# Patient Record
Sex: FEMALE | Employment: OTHER
[De-identification: names, ages, dates, MRNs, and addresses within clinical notes are randomized per-mention and may not be internally consistent; named-entity substitution may affect disease eponyms.]

---

## 2020-06-10 ENCOUNTER — OFFICE VISIT (OUTPATIENT)
Age: 65
End: 2020-06-10

## 2020-06-15 ENCOUNTER — OFFICE VISIT (OUTPATIENT)
Dept: URBAN - METROPOLITAN AREA CLINIC 7 | Facility: CLINIC | Age: 65
End: 2020-06-15

## 2020-06-18 ENCOUNTER — TELEPHONE ENCOUNTER (OUTPATIENT)
Dept: URBAN - METROPOLITAN AREA CLINIC 9 | Facility: CLINIC | Age: 65
End: 2020-06-18

## 2020-06-18 ENCOUNTER — OFFICE VISIT (OUTPATIENT)
Age: 65
End: 2020-06-18

## 2020-07-07 ENCOUNTER — TELEPHONE ENCOUNTER (OUTPATIENT)
Dept: URBAN - METROPOLITAN AREA CLINIC 9 | Facility: CLINIC | Age: 65
End: 2020-07-07

## 2020-07-09 ENCOUNTER — OFFICE VISIT (OUTPATIENT)
Dept: URBAN - METROPOLITAN AREA CLINIC 7 | Facility: CLINIC | Age: 65
End: 2020-07-09

## 2020-07-16 ENCOUNTER — TELEPHONE ENCOUNTER (OUTPATIENT)
Dept: URBAN - METROPOLITAN AREA CLINIC 9 | Facility: CLINIC | Age: 65
End: 2020-07-16

## 2020-07-27 ENCOUNTER — OFFICE VISIT (OUTPATIENT)
Dept: URBAN - METROPOLITAN AREA SURGERY CENTER 5 | Facility: SURGERY CENTER | Age: 65
End: 2020-07-27

## 2020-08-06 ENCOUNTER — OFFICE VISIT (OUTPATIENT)
Dept: URBAN - METROPOLITAN AREA CLINIC 7 | Facility: CLINIC | Age: 65
End: 2020-08-06

## 2020-10-01 ENCOUNTER — OFFICE VISIT (OUTPATIENT)
Age: 65
End: 2020-10-01

## 2021-05-15 ENCOUNTER — OFFICE VISIT (OUTPATIENT)
Age: 66
End: 2021-05-15

## 2021-07-01 ENCOUNTER — OFFICE VISIT (OUTPATIENT)
Age: 66
End: 2021-07-01

## 2021-09-23 ENCOUNTER — OFFICE VISIT (OUTPATIENT)
Dept: URBAN - METROPOLITAN AREA CLINIC 7 | Facility: CLINIC | Age: 66
End: 2021-09-23

## 2021-10-06 ENCOUNTER — LAB OUTSIDE AN ENCOUNTER (OUTPATIENT)
Age: 66
End: 2021-10-06

## 2021-10-06 LAB — GIARDIA AG, EIA, STOOL: (no result)

## 2021-10-11 LAB — CONCENTRATE (1): (no result)

## 2021-10-13 LAB
CALPROTECTIN, STOOL: (no result)
CAMPYLOBACTER, CULTURE: (no result)
CLOSTRIDIUM DIFFICILE TOXINB,QL REAL TIME PCR: NOT DETECTED
CRYPTOSPORIDIUM ANTIGEN, EIA: (no result)
GIARDIA AG, EIA, STOOL: (no result)
OVA AND PARASITES, CONC AND PERM SMEAR: (no result)
PANCREATIC ELASTASE-1: (no result)

## 2021-10-21 ENCOUNTER — TELEPHONE ENCOUNTER (OUTPATIENT)
Dept: URBAN - METROPOLITAN AREA CLINIC 9 | Facility: CLINIC | Age: 66
End: 2021-10-21

## 2021-10-22 ENCOUNTER — TELEPHONE ENCOUNTER (OUTPATIENT)
Dept: URBAN - METROPOLITAN AREA CLINIC 9 | Facility: CLINIC | Age: 66
End: 2021-10-22

## 2021-10-25 ENCOUNTER — OFFICE VISIT (OUTPATIENT)
Dept: URBAN - METROPOLITAN AREA SURGERY CENTER 5 | Facility: SURGERY CENTER | Age: 66
End: 2021-10-25

## 2021-10-26 ENCOUNTER — TELEPHONE ENCOUNTER (OUTPATIENT)
Dept: URBAN - METROPOLITAN AREA CLINIC 9 | Facility: CLINIC | Age: 66
End: 2021-10-26

## 2021-11-02 ENCOUNTER — TELEPHONE ENCOUNTER (OUTPATIENT)
Dept: URBAN - METROPOLITAN AREA CLINIC 9 | Facility: CLINIC | Age: 66
End: 2021-11-02

## 2021-11-16 ENCOUNTER — OFFICE VISIT (OUTPATIENT)
Dept: URBAN - METROPOLITAN AREA CLINIC 7 | Facility: CLINIC | Age: 66
End: 2021-11-16

## 2022-01-07 ENCOUNTER — OFFICE VISIT (OUTPATIENT)
Dept: URBAN - METROPOLITAN AREA CLINIC 7 | Facility: CLINIC | Age: 67
End: 2022-01-07

## 2022-01-10 ENCOUNTER — OFFICE VISIT (OUTPATIENT)
Dept: URBAN - METROPOLITAN AREA CLINIC 7 | Facility: CLINIC | Age: 67
End: 2022-01-10

## 2022-01-20 ENCOUNTER — TELEPHONE ENCOUNTER (OUTPATIENT)
Dept: URBAN - METROPOLITAN AREA CLINIC 9 | Facility: CLINIC | Age: 67
End: 2022-01-20

## 2022-01-26 ENCOUNTER — TELEPHONE ENCOUNTER (OUTPATIENT)
Dept: URBAN - METROPOLITAN AREA CLINIC 9 | Facility: CLINIC | Age: 67
End: 2022-01-26

## 2022-01-29 ENCOUNTER — TELEPHONE ENCOUNTER (OUTPATIENT)
Dept: URBAN - METROPOLITAN AREA CLINIC 9 | Facility: CLINIC | Age: 67
End: 2022-01-29

## 2022-02-02 ENCOUNTER — LAB OUTSIDE AN ENCOUNTER (OUTPATIENT)
Age: 67
End: 2022-02-02

## 2022-02-03 LAB
ABSOLUTE BASOPHILS: (no result)
ABSOLUTE EOSINOPHILS: (no result)
ABSOLUTE LYMPHOCYTES: (no result)
ABSOLUTE MONOCYTES: (no result)
ABSOLUTE NEUTROPHILS: (no result)
ALBUMIN/GLOBULIN RATIO: (no result)
ALBUMIN: (no result)
ALKALINE PHOSPHATASE: (no result)
ALT: (no result)
AMYLASE: (no result)
AST: (no result)
BASOPHILS: (no result)
BILIRUBIN, TOTAL: (no result)
BUN/CREATININE RATIO: (no result)
CA 19-9: (no result)
CALCIUM: (no result)
CARBON DIOXIDE: (no result)
CHLORIDE: (no result)
CREATININE: (no result)
EGFR AFRICAN AMERICAN: 105
EGFR NON-AFR. AMERICAN: 91
EOSINOPHILS: (no result)
GLOBULIN: 3
GLUCOSE: (no result)
HEMATOCRIT: (no result)
HEMOGLOBIN: (no result)
LIPASE: (no result)
LYMPHOCYTES: (no result)
MCH: (no result)
MCHC: (no result)
MCV: (no result)
MONOCYTES: (no result)
MPV: (no result)
NEUTROPHILS: (no result)
PLATELET COUNT: 356
POTASSIUM: (no result)
PROTEIN, TOTAL: (no result)
RDW: (no result)
RED BLOOD CELL COUNT: (no result)
SODIUM: (no result)
UREA NITROGEN (BUN): (no result)
VITAMIN D,25-OH,TOTAL,IA: (no result)
WHITE BLOOD CELL COUNT: 6.6

## 2022-02-04 ENCOUNTER — TELEPHONE ENCOUNTER (OUTPATIENT)
Dept: URBAN - METROPOLITAN AREA CLINIC 9 | Facility: CLINIC | Age: 67
End: 2022-02-04

## 2022-05-20 ENCOUNTER — TELEPHONE ENCOUNTER (OUTPATIENT)
Dept: URBAN - METROPOLITAN AREA CLINIC 9 | Facility: CLINIC | Age: 67
End: 2022-05-20

## 2022-05-23 ENCOUNTER — OFFICE VISIT (OUTPATIENT)
Dept: URBAN - METROPOLITAN AREA CLINIC 7 | Facility: CLINIC | Age: 67
End: 2022-05-23

## 2022-07-30 ENCOUNTER — TELEPHONE ENCOUNTER (OUTPATIENT)
Age: 67
End: 2022-07-30

## 2022-07-30 RX ORDER — PANCRELIPASE 24000; 76000; 120000 [USP'U]/1; [USP'U]/1; [USP'U]/1
2 (TWO) CAPSULE, DELAYED RELEASE PELLETS ORAL
Qty: 0 | Refills: 16 | OUTPATIENT
Start: 2017-05-01 | End: 2020-06-10

## 2022-07-30 RX ORDER — GLYCERIN, PETROLATUM, PHENYLEPHRINE HCL, PRAMOXINE HCL 144; 2.5; 10; 15 MG/G; MG/G; MG/G; MG/G
1 (ONE) CREAM TOPICAL TWICE A DAY
Qty: 0 | Refills: 2 | OUTPATIENT
Start: 2016-12-01 | End: 2016-12-08

## 2022-07-30 RX ORDER — PSYLLIUM SEED
1 TABLESPOON PACKET (EA) ORAL
Qty: 0 | Refills: 16 | OUTPATIENT
Start: 2016-12-01 | End: 2017-04-03

## 2022-07-30 RX ORDER — MULTIVIT-MIN/IRON/FOLIC ACID/K 18-600-40
1 (ONE) CAPSULE ORAL
Qty: 0 | Refills: 16 | OUTPATIENT
Start: 2017-04-03 | End: 2022-01-07

## 2022-07-31 ENCOUNTER — TELEPHONE ENCOUNTER (OUTPATIENT)
Age: 67
End: 2022-07-31

## 2022-07-31 RX ORDER — PANCRELIPASE 24000; 76000; 120000 [USP'U]/1; [USP'U]/1; [USP'U]/1
2 (TWO) CAPSULE, DELAYED RELEASE PELLETS ORAL
Qty: 0 | Refills: 16 | Status: ACTIVE | COMMUNITY
Start: 2017-05-01

## 2022-07-31 RX ORDER — GLYCERIN, PETROLATUM, PHENYLEPHRINE HCL, PRAMOXINE HCL 144; 2.5; 10; 15 MG/G; MG/G; MG/G; MG/G
1 (ONE) CREAM TOPICAL TWICE A DAY
Qty: 0 | Refills: 2 | Status: ACTIVE | COMMUNITY
Start: 2016-12-01

## 2022-07-31 RX ORDER — PSYLLIUM SEED
1 TABLESPOON PACKET (EA) ORAL
Qty: 0 | Refills: 16 | Status: ACTIVE | COMMUNITY
Start: 2016-12-01

## 2022-07-31 RX ORDER — MULTIVIT-MIN/IRON/FOLIC ACID/K 18-600-40
1 (ONE) CAPSULE ORAL
Qty: 0 | Refills: 16 | Status: ACTIVE | COMMUNITY
Start: 2017-04-03

## 2022-08-01 ENCOUNTER — LAB OUTSIDE AN ENCOUNTER (OUTPATIENT)
Dept: URBAN - METROPOLITAN AREA CLINIC 7 | Facility: CLINIC | Age: 67
End: 2022-08-01

## 2022-08-01 ENCOUNTER — TELEPHONE ENCOUNTER (OUTPATIENT)
Dept: URBAN - METROPOLITAN AREA CLINIC 7 | Facility: CLINIC | Age: 67
End: 2022-08-01

## 2022-08-01 ENCOUNTER — WEB ENCOUNTER (OUTPATIENT)
Dept: URBAN - METROPOLITAN AREA CLINIC 7 | Facility: CLINIC | Age: 67
End: 2022-08-01

## 2022-08-01 ENCOUNTER — OFFICE VISIT (OUTPATIENT)
Dept: URBAN - METROPOLITAN AREA CLINIC 7 | Facility: CLINIC | Age: 67
End: 2022-08-01
Payer: MEDICARE

## 2022-08-01 VITALS
SYSTOLIC BLOOD PRESSURE: 132 MMHG | BODY MASS INDEX: 22.16 KG/M2 | WEIGHT: 133 LBS | TEMPERATURE: 97.8 F | DIASTOLIC BLOOD PRESSURE: 82 MMHG | HEIGHT: 65 IN

## 2022-08-01 DIAGNOSIS — Z98.890 HISTORY OF PANCREATIC SURGERY: ICD-10-CM

## 2022-08-01 DIAGNOSIS — K86.1 CHRONIC PANCREATITIS, UNSPECIFIED PANCREATITIS TYPE: ICD-10-CM

## 2022-08-01 DIAGNOSIS — K86.89 PANCREATIC INSUFFICIENCY: ICD-10-CM

## 2022-08-01 DIAGNOSIS — R63.4 WEIGHT LOSS: ICD-10-CM

## 2022-08-01 PROCEDURE — 99204 OFFICE O/P NEW MOD 45 MIN: CPT | Performed by: INTERNAL MEDICINE

## 2022-08-01 RX ORDER — PSYLLIUM SEED
1 TABLESPOON PACKET (EA) ORAL
Qty: 0 | Refills: 16 | Status: DISCONTINUED | COMMUNITY
Start: 2016-12-01

## 2022-08-01 RX ORDER — COLESTIPOL HYDROCHLORIDE 1 G/1
2 TABLETS TABLET, FILM COATED ORAL TWICE A DAY
Qty: 120 TABLET | Refills: 3
Start: 2022-08-01

## 2022-08-01 RX ORDER — QUETIAPINE 100 MG/1
1 TABLET AT BEDTIME TABLET, FILM COATED ORAL ONCE A DAY
Status: ACTIVE | COMMUNITY

## 2022-08-01 RX ORDER — GLYCERIN, PETROLATUM, PHENYLEPHRINE HCL, PRAMOXINE HCL 144; 2.5; 10; 15 MG/G; MG/G; MG/G; MG/G
1 (ONE) CREAM TOPICAL TWICE A DAY
Qty: 0 | Refills: 2 | Status: DISCONTINUED | COMMUNITY
Start: 2016-12-01

## 2022-08-01 RX ORDER — COLESTIPOL HYDROCHLORIDE 1 G/1
2 TABLETS TABLET, FILM COATED ORAL TWICE A DAY
Qty: 120 TABLET | Refills: 3 | OUTPATIENT
Start: 2022-08-01

## 2022-08-01 RX ORDER — MULTIVIT-MIN/IRON/FOLIC ACID/K 18-600-40
1 (ONE) CAPSULE ORAL
Qty: 0 | Refills: 16 | Status: ACTIVE | COMMUNITY
Start: 2017-04-03

## 2022-08-01 RX ORDER — PANCRELIPASE 24000; 76000; 120000 [USP'U]/1; [USP'U]/1; [USP'U]/1
2 (TWO) CAPSULE, DELAYED RELEASE PELLETS ORAL
Qty: 0 | Refills: 16 | Status: ACTIVE | COMMUNITY
Start: 2017-05-01

## 2022-08-01 NOTE — HPI-TODAY'S VISIT:
LV 1/2022. Eval was initially for perianal soreness, soiling, and prolapse with internal hemorrhoids. Hx of chronic calcific pancreatitis from likely EtOH and tobacco use, s/p Eddy procedure in 2017 with Dr. Mcdonald. Normal fecal elastase then and EUS at that time with no discrete pancreatic mass. Hx of polyps (colon in 2016 with 2 TA). Colon done 7/2020 with 4 adenomas removed, and 1 large 3-cm SSA that could not be removed endoscopically with right hemicolectomy in 8/2020 with Dr. Tracy, hernia repair, and lysis of adhesions. She underwent hemorrhoidal banding after her surgery, and then had recurrence of ventral hernia (repaired by Dr. Broderick). Issues with diarrhea post-operatively. Calprotectin 23, elastase 73, cdiff negative, culture/giardia negative. Colon 10/2021 with multiple small HP and TA polyps, bx negative for inflammation, and small int hems. MRI pancreas ordered not done. Treated with Creon. Recent lab testing done December 20, 2021 demonstrated a hemoglobin of 13.3 and a normal INR and normal creatinine and liver function tests.  She had a recent robotic-assisted laparoscopic repair of a recurrent incisional hernia with mesh in December 2021. CT pancreas 1/2022 with chronic calcific pancreatitis changes, no mass, mild CBD dilation to 12 mm. CBC, CMP, amylase, lipase, CA 19-9 all normal. Treated with Creon, low fat diet. 137 lbs last visit. FU now for weight loss. She tells me she has lost a little weight, about low 130s. Using Creon 2 tabs with meals, 1 with snack. Appetite is good, drive to eat is strong, eating well, no nausea, no vomiting, no bleeding. She is having some night sweats as well. Did have some night sweats, no fevers. Her lack of weight gain is her main concern.

## 2022-08-09 ENCOUNTER — LAB OUTSIDE AN ENCOUNTER (OUTPATIENT)
Dept: URBAN - METROPOLITAN AREA CLINIC 7 | Facility: CLINIC | Age: 67
End: 2022-08-09

## 2022-08-10 ENCOUNTER — TELEPHONE ENCOUNTER (OUTPATIENT)
Dept: URBAN - METROPOLITAN AREA CLINIC 7 | Facility: CLINIC | Age: 67
End: 2022-08-10

## 2022-08-10 LAB
A/G RATIO: 1.5
ABSOLUTE BASOPHILS: 111
ABSOLUTE EOSINOPHILS: 118
ABSOLUTE LYMPHOCYTES: 1769
ABSOLUTE MONOCYTES: 481
ABSOLUTE NEUTROPHILS: 4921
ALBUMIN: 4.3
ALKALINE PHOSPHATASE: 56
ALT (SGPT): 16
AMYLASE: 48
AST (SGOT): 14
BASOPHILS: 1.5
BILIRUBIN, TOTAL: 0.5
BUN/CREATININE RATIO: (no result)
BUN: 16
CA 19-9: 18
CALCIUM: 10
CARBON DIOXIDE, TOTAL: 28
CEA: 1.2
CHLORIDE: 105
CREATININE: 0.74
EGFR: 89
EOSINOPHILS: 1.6
GLOBULIN, TOTAL: 2.9
GLUCOSE: 105
HEMATOCRIT: 41.4
HEMOGLOBIN: 14
LIPASE: 31
LYMPHOCYTES: 23.9
MCH: 30
MCHC: 33.8
MCV: 88.8
MONOCYTES: 6.5
MPV: 8.6
NEUTROPHILS: 66.5
PLATELET COUNT: 405
POTASSIUM: 4.1
PROTEIN, TOTAL: 7.2
RDW: 12.7
RED BLOOD CELL COUNT: 4.66
SODIUM: 140
WHITE BLOOD CELL COUNT: 7.4

## 2022-08-19 ENCOUNTER — OFFICE VISIT (OUTPATIENT)
Dept: URBAN - METROPOLITAN AREA CLINIC 7 | Facility: CLINIC | Age: 67
End: 2022-08-19

## 2022-10-12 ENCOUNTER — TELEPHONE ENCOUNTER (OUTPATIENT)
Dept: URBAN - METROPOLITAN AREA CLINIC 7 | Facility: CLINIC | Age: 67
End: 2022-10-12

## 2022-10-12 RX ORDER — MULTIVIT-MIN/IRON/FOLIC ACID/K 18-600-40
1 (ONE) CAPSULE ORAL
Qty: 0 | Refills: 16 | Status: ACTIVE | COMMUNITY
Start: 2017-04-03

## 2022-10-12 RX ORDER — PANCRELIPASE 36000; 180000; 114000 [USP'U]/1; [USP'U]/1; [USP'U]/1
AS DIRECTED CAPSULE, DELAYED RELEASE PELLETS ORAL
Qty: 250 CAPSULE | Refills: 3 | OUTPATIENT

## 2022-10-12 RX ORDER — QUETIAPINE 100 MG/1
1 TABLET AT BEDTIME TABLET, FILM COATED ORAL ONCE A DAY
Status: ACTIVE | COMMUNITY

## 2022-10-12 RX ORDER — PANCRELIPASE 24000; 76000; 120000 [USP'U]/1; [USP'U]/1; [USP'U]/1
2 (TWO) CAPSULE, DELAYED RELEASE PELLETS ORAL
Qty: 0 | Refills: 16 | Status: ACTIVE | COMMUNITY
Start: 2017-05-01

## 2022-10-12 RX ORDER — COLESTIPOL HYDROCHLORIDE 1 G/1
2 TABLETS TABLET, FILM COATED ORAL TWICE A DAY
Qty: 120 TABLET | Refills: 3 | Status: ACTIVE | COMMUNITY
Start: 2022-08-01

## 2022-10-26 ENCOUNTER — TELEPHONE ENCOUNTER (OUTPATIENT)
Dept: URBAN - METROPOLITAN AREA CLINIC 9 | Facility: CLINIC | Age: 67
End: 2022-10-26

## 2022-10-26 RX ORDER — PANCRELIPASE 36000; 180000; 114000 [USP'U]/1; [USP'U]/1; [USP'U]/1
AS DIRECTED CAPSULE, DELAYED RELEASE PELLETS ORAL
Qty: 900 | Refills: 3

## 2022-11-28 ENCOUNTER — OFFICE VISIT (OUTPATIENT)
Dept: URBAN - METROPOLITAN AREA CLINIC 7 | Facility: CLINIC | Age: 67
End: 2022-11-28

## 2022-12-01 ENCOUNTER — TELEPHONE ENCOUNTER (OUTPATIENT)
Dept: URBAN - METROPOLITAN AREA CLINIC 7 | Facility: CLINIC | Age: 67
End: 2022-12-01

## 2022-12-07 ENCOUNTER — ERX REFILL RESPONSE (OUTPATIENT)
Dept: URBAN - METROPOLITAN AREA CLINIC 7 | Facility: CLINIC | Age: 67
End: 2022-12-07

## 2022-12-07 RX ORDER — COLESTIPOL HYDROCHLORIDE 1 G/1
2 TABLETS TABLET, FILM COATED ORAL TWICE A DAY
Qty: 120 TABLET | Refills: 3 | OUTPATIENT

## 2022-12-07 RX ORDER — COLESTIPOL HYDROCHLORIDE 1 G/1
TAKE TWO TABLETS BY MOUTH TWICE A DAY FOR 30 DAYS TABLET ORAL
Qty: 120 TABLET | Refills: 3 | OUTPATIENT

## 2022-12-09 ENCOUNTER — ERX REFILL RESPONSE (OUTPATIENT)
Dept: URBAN - METROPOLITAN AREA CLINIC 7 | Facility: CLINIC | Age: 67
End: 2022-12-09

## 2022-12-09 RX ORDER — COLESTIPOL HYDROCHLORIDE 1 G/1
TAKE TWO TABLETS BY MOUTH TWICE A DAY FOR 30 DAYS TABLET ORAL
Qty: 120 TABLET | Refills: 3 | OUTPATIENT

## 2022-12-13 ENCOUNTER — TELEPHONE ENCOUNTER (OUTPATIENT)
Dept: URBAN - METROPOLITAN AREA CLINIC 7 | Facility: CLINIC | Age: 67
End: 2022-12-13

## 2023-01-31 ENCOUNTER — TELEPHONE ENCOUNTER (OUTPATIENT)
Dept: URBAN - METROPOLITAN AREA CLINIC 7 | Facility: CLINIC | Age: 68
End: 2023-01-31

## 2023-01-31 RX ORDER — COLESTIPOL HYDROCHLORIDE 1 G/1
2 TABLETS TABLET ORAL TWICE DAILY
Qty: 360 TABLET | Refills: 3

## 2023-02-27 ENCOUNTER — OFFICE VISIT (OUTPATIENT)
Dept: URBAN - METROPOLITAN AREA CLINIC 7 | Facility: CLINIC | Age: 68
End: 2023-02-27
Payer: MEDICARE

## 2023-02-27 ENCOUNTER — WEB ENCOUNTER (OUTPATIENT)
Dept: URBAN - METROPOLITAN AREA CLINIC 7 | Facility: CLINIC | Age: 68
End: 2023-02-27

## 2023-02-27 VITALS
DIASTOLIC BLOOD PRESSURE: 70 MMHG | SYSTOLIC BLOOD PRESSURE: 114 MMHG | BODY MASS INDEX: 22.82 KG/M2 | TEMPERATURE: 97.2 F | HEIGHT: 65 IN | HEART RATE: 68 BPM | WEIGHT: 137 LBS

## 2023-02-27 DIAGNOSIS — K86.89 PANCREATIC INSUFFICIENCY: ICD-10-CM

## 2023-02-27 DIAGNOSIS — Z98.890 HISTORY OF PANCREATIC SURGERY: ICD-10-CM

## 2023-02-27 DIAGNOSIS — R63.4 WEIGHT LOSS: ICD-10-CM

## 2023-02-27 DIAGNOSIS — K86.1 CHRONIC PANCREATITIS, UNSPECIFIED PANCREATITIS TYPE: ICD-10-CM

## 2023-02-27 PROBLEM — 89362005: Status: ACTIVE | Noted: 2022-08-01

## 2023-02-27 PROBLEM — 235494005: Status: ACTIVE | Noted: 2022-08-01

## 2023-02-27 PROCEDURE — 99214 OFFICE O/P EST MOD 30 MIN: CPT | Performed by: INTERNAL MEDICINE

## 2023-02-27 RX ORDER — QUETIAPINE 100 MG/1
1 TABLET AT BEDTIME TABLET, FILM COATED ORAL ONCE A DAY
Status: ACTIVE | COMMUNITY

## 2023-02-27 RX ORDER — PANCRELIPASE 36000; 180000; 114000 [USP'U]/1; [USP'U]/1; [USP'U]/1
AS DIRECTED CAPSULE, DELAYED RELEASE PELLETS ORAL
Qty: 900 | Refills: 3 | Status: ACTIVE | COMMUNITY

## 2023-02-27 RX ORDER — PANCRELIPASE 24000; 76000; 120000 [USP'U]/1; [USP'U]/1; [USP'U]/1
2 (TWO) CAPSULE, DELAYED RELEASE PELLETS ORAL
Qty: 0 | Refills: 16 | Status: DISCONTINUED | COMMUNITY
Start: 2017-05-01

## 2023-02-27 RX ORDER — COLESTIPOL HYDROCHLORIDE 1 G/1
2 TABLETS TABLET ORAL TWICE DAILY
Qty: 360 TABLET | Refills: 3 | Status: ACTIVE | COMMUNITY

## 2023-02-27 RX ORDER — PANCRELIPASE 36000; 180000; 114000 [USP'U]/1; [USP'U]/1; [USP'U]/1
AS DIRECTED CAPSULE, DELAYED RELEASE PELLETS ORAL
Qty: 480 CAPSULE | Refills: 8

## 2023-02-27 RX ORDER — MULTIVIT-MIN/IRON/FOLIC ACID/K 18-600-40
1 (ONE) CAPSULE ORAL
Qty: 0 | Refills: 16 | Status: ACTIVE | COMMUNITY
Start: 2017-04-03

## 2023-02-27 NOTE — HPI-TODAY'S VISIT:
LV 8/2022. Eval was initially for perianal soreness, soiling, and prolapse with internal hemorrhoids. Also, hx of chronic calcific pancreatitis from likely EtOH and tobacco use, s/p Eddy procedure in 2017 with Dr. Mcdonald. Normal fecal elastase then and EUS at that time with no discrete pancreatic mass. Hx of polyps (colon in 2016 with 2 TA). Colon done 7/2020 with 4 adenomas removed, and 1 large 3-cm SSA that could not be removed endoscopically with right hemicolectomy in 8/2020 with Dr. Tracy, hernia repair, and lysis of adhesions. She underwent hemorrhoidal banding after her surgery, and then had recurrence of ventral hernia (repaired by Dr. Broderick). Continued with issues with diarrhea post-operatively. Calprotectin 23, elastase 73, cdiff negative, culture/giardia negative. Colon 10/2021 with multiple small HP and TA polyps, bx negative for inflammation, and small int hems. Treated with Creon. Lab testing done December 20, 2021 demonstrated a hemoglobin of 13.3 and a normal INR and normal creatinine and liver function tests. Had a robotic-assisted laparoscopic repair of a recurrent incisional hernia with mesh in December 2021. CT pancreas 1/2022 with chronic calcific pancreatitis changes, no mass, mild CBD dilation to 12 mm. CBC, CMP, amylase, lipase, CA 19-9 all normal. Treated with Creon, low fat diet. 137 lbs last visit. LV, eval for weight loss. She tells me she has lost a little weight, about low 130s. Using Creon 2 tabs with meals, 1 with snack. Appetite is good, drive to eat is strong, eating well, no nausea, no vomiting, no bleeding. Was having some night sweats as well. Did have some night sweats, no fevers. Her lack of weight gain is her main concern. Plan was for CT pancreas to re-evaluate pancreas given her weight loss, but do feel that her pancreatic insufficiency is leading to some of her weight issues and malabsorption. Bile salt diarrhea is also possible and so started colestipol. Wanted CBC, CMP, amylase, lipase, CA 19-9, CEA as well. Advised to speak with gyne about night sweats as well in case of a hormonal cause.  CT pancreas August 2022 demonstrated a small benign liver cyst, absent gallbladder, compensatory dilation of the CBD measuring 1.2 cm, no masses or stones, diffuse atrophic changes the pancreatic body and tail with coarse dystrophic calcifications seen throughout the pancreatic body and tail and mild dilation of the mid and distal pancreatic duct with no evidence of associated masses, no lymphadenopathy, no bowel inflammation.  Labs in August 2022 demonstrated normal CEA and CA 19-9 as well as a normal comprehensive panel normal pancreatic enzymes and hemoglobin of 14.  Tells me she had some "pancreas" pain a few weeks ago, but generally doing okay. Weight is up 4 lbs since last visit. She has started some bioidentical hormones for low estrogen and low testosterone but then had abd pain and stopped and felt better. Uses estrogel on her arms. Uses Creon still, 2-3 with each meals. She is eating pretty well.

## 2023-09-20 ENCOUNTER — TELEPHONE ENCOUNTER (OUTPATIENT)
Dept: URBAN - METROPOLITAN AREA CLINIC 7 | Facility: CLINIC | Age: 68
End: 2023-09-20

## 2023-12-01 ENCOUNTER — LAB OUTSIDE AN ENCOUNTER (OUTPATIENT)
Dept: URBAN - METROPOLITAN AREA CLINIC 7 | Facility: CLINIC | Age: 68
End: 2023-12-01

## 2023-12-01 ENCOUNTER — DASHBOARD ENCOUNTERS (OUTPATIENT)
Age: 68
End: 2023-12-01

## 2023-12-01 ENCOUNTER — OFFICE VISIT (OUTPATIENT)
Dept: URBAN - METROPOLITAN AREA CLINIC 7 | Facility: CLINIC | Age: 68
End: 2023-12-01
Payer: MEDICARE

## 2023-12-01 VITALS
SYSTOLIC BLOOD PRESSURE: 110 MMHG | TEMPERATURE: 97.8 F | DIASTOLIC BLOOD PRESSURE: 70 MMHG | BODY MASS INDEX: 22.99 KG/M2 | RESPIRATION RATE: 16 BRPM | HEIGHT: 65 IN | WEIGHT: 138 LBS

## 2023-12-01 DIAGNOSIS — K86.89 PANCREATIC INSUFFICIENCY: ICD-10-CM

## 2023-12-01 DIAGNOSIS — R63.4 WEIGHT LOSS: ICD-10-CM

## 2023-12-01 DIAGNOSIS — Z98.890 HISTORY OF PANCREATIC SURGERY: ICD-10-CM

## 2023-12-01 DIAGNOSIS — K86.1 CHRONIC PANCREATITIS, UNSPECIFIED PANCREATITIS TYPE: ICD-10-CM

## 2023-12-01 PROCEDURE — 99214 OFFICE O/P EST MOD 30 MIN: CPT | Performed by: INTERNAL MEDICINE

## 2023-12-01 RX ORDER — COLESTIPOL HYDROCHLORIDE 1 G/1
2 TABLETS TABLET ORAL TWICE DAILY
Qty: 360 TABLET | Refills: 3 | Status: ACTIVE | COMMUNITY

## 2023-12-01 RX ORDER — PANCRELIPASE 36000; 180000; 114000 [USP'U]/1; [USP'U]/1; [USP'U]/1
AS DIRECTED CAPSULE, DELAYED RELEASE PELLETS ORAL
Qty: 480 CAPSULE | Refills: 8
End: 2024-08-27

## 2023-12-01 RX ORDER — VALACYCLOVIR 500 MG/1
TAKE ONE TABLET BY MOUTH ONE TIME DAILY TABLET ORAL
Qty: 90 UNSPECIFIED | Refills: 0 | Status: ACTIVE | COMMUNITY

## 2023-12-01 RX ORDER — MULTIVIT-MIN/IRON/FOLIC ACID/K 18-600-40
1 (ONE) CAPSULE ORAL
Qty: 0 | Refills: 16 | Status: ACTIVE | COMMUNITY
Start: 2017-04-03

## 2023-12-01 RX ORDER — QUETIAPINE 100 MG/1
1 TABLET AT BEDTIME TABLET, FILM COATED ORAL ONCE A DAY
Status: ACTIVE | COMMUNITY

## 2023-12-01 RX ORDER — PANCRELIPASE 36000; 180000; 114000 [USP'U]/1; [USP'U]/1; [USP'U]/1
AS DIRECTED CAPSULE, DELAYED RELEASE PELLETS ORAL
Qty: 480 CAPSULE | Refills: 8 | Status: ACTIVE | COMMUNITY

## 2023-12-01 NOTE — HPI-TODAY'S VISIT:
LV 2/2023. Eval was initially for perianal soreness, soiling, and prolapse with internal hemorrhoids. Also, hx of chronic calcific pancreatitis from likely EtOH and tobacco use, s/p Eddy procedure in 2017 with Dr. Mcdonald. Normal fecal elastase then and EUS at that time with no discrete pancreatic mass. Hx of polyps (colon in 2016 with 2 TA). Colon done 7/2020 with 4 adenomas removed, and 1 large 3-cm SSA that could not be removed endoscopically with right hemicolectomy in 8/2020 with Dr. Tracy, hernia repair, and lysis of adhesions. She underwent hemorrhoidal banding after her surgery, and then had recurrence of ventral hernia (repaired by Dr. Broderick). Continued with issues with diarrhea post-operatively. Calprotectin 23, elastase 73, cdiff negative, culture/giardia negative. Colon 10/2021 with multiple small HP and TA polyps, bx negative for inflammation, and small int hems. Treated with Creon. Lab testing done December 20, 2021 demonstrated a hemoglobin of 13.3 and a normal INR and normal creatinine and liver function tests. Had a robotic-assisted laparoscopic repair of a recurrent incisional hernia with mesh in December 2021. CT pancreas 1/2022 with chronic calcific pancreatitis changes, no mass, mild CBD dilation to 12 mm. CBC, CMP, amylase, lipase, CA 19-9 all normal. Treated with Creon, low fat diet. 137 lbs last visit. LV, eval for weight loss. She tells me she has lost a little weight, about low 130s. Using Creon 2 tabs with meals, 1 with snack. Appetite is good, drive to eat is strong, eating well, no nausea, no vomiting, no bleeding. Was having some night sweats as well. Did have some night sweats, no fevers. Her lack of weight gain was her main concern. Plan was for CT pancreas to re-evaluate pancreas given her weight loss, but did feel that her pancreatic insufficiency was leading to some of her weight issues and malabsorption. Bile salt diarrhea is also possible and so started colestipol. Wanted CBC, CMP, amylase, lipase, CA 19-9, CEA as well. Advised to speak with gyne about night sweats as well in case of a hormonal cause. CT pancreas August 2022 demonstrated a small benign liver cyst, absent gallbladder, compensatory dilation of the CBD measuring 1.2 cm, no masses or stones, diffuse atrophic changes the pancreatic body and tail with coarse dystrophic calcifications seen throughout the pancreatic body and tail and mild dilation of the mid and distal pancreatic duct with no evidence of associated masses, no lymphadenopathy, no bowel inflammation.  Labs in August 2022 demonstrated normal CEA and CA 19-9 as well as a normal comprehensive panel normal pancreatic enzymes and hemoglobin of 14. Told me she had some "pancreas" pain a few weeks ago, but generally doing okay. Weight was up 4 lbs since last visit. She has started some bioidentical hormones for low estrogen and low testosterone but then had abd pain and stopped and felt better. Uses estrogel on her arms. Uses Creon still, 2-3 with each meals. She is eating pretty well. At last visit, it was unclear whether or not her hormonal supplements had led to more pain recently, although at the time of her visit she was better.  She continue with pancreatic enzymes and colestipol she has diarrhea without those agents.  I did expect her to have some pain symptoms from time to time depending on her diet but did not think she needed imaging again.  Plan was to get a CT pancreas in August 2023.  Her weight is stable, 138, was 137 last time. She has a little right flank discomfort, but eating well, enzymes are working well for her. Doing metamucil which has helped her stool consistency.

## 2023-12-15 ENCOUNTER — TELEPHONE ENCOUNTER (OUTPATIENT)
Dept: URBAN - METROPOLITAN AREA CLINIC 7 | Facility: CLINIC | Age: 68
End: 2023-12-15

## 2023-12-15 RX ORDER — COLESTIPOL HYDROCHLORIDE 1 G/1
2 TABLETS TABLET, FILM COATED ORAL TWICE DAILY
Qty: 120 | Refills: 3 | OUTPATIENT
Start: 2023-12-15

## 2024-01-04 ENCOUNTER — APPOINTMENT (RX ONLY)
Dept: URBAN - METROPOLITAN AREA CLINIC 333 | Facility: CLINIC | Age: 69
Setting detail: DERMATOLOGY
End: 2024-01-04

## 2024-01-04 ENCOUNTER — TELEPHONE ENCOUNTER (OUTPATIENT)
Dept: URBAN - METROPOLITAN AREA CLINIC 7 | Facility: CLINIC | Age: 69
End: 2024-01-04

## 2024-01-04 DIAGNOSIS — L98.8 OTHER SPECIFIED DISORDERS OF THE SKIN AND SUBCUTANEOUS TISSUE: ICD-10-CM

## 2024-01-04 DIAGNOSIS — L663 OTHER SPECIFIED DISEASES OF HAIR AND HAIR FOLLICLES: ICD-10-CM

## 2024-01-04 DIAGNOSIS — D22 MELANOCYTIC NEVI: ICD-10-CM

## 2024-01-04 DIAGNOSIS — L82.1 OTHER SEBORRHEIC KERATOSIS: ICD-10-CM

## 2024-01-04 DIAGNOSIS — L81.4 OTHER MELANIN HYPERPIGMENTATION: ICD-10-CM

## 2024-01-04 DIAGNOSIS — L57.8 OTHER SKIN CHANGES DUE TO CHRONIC EXPOSURE TO NONIONIZING RADIATION: ICD-10-CM

## 2024-01-04 DIAGNOSIS — L82.0 INFLAMED SEBORRHEIC KERATOSIS: ICD-10-CM

## 2024-01-04 DIAGNOSIS — L73.9 FOLLICULAR DISORDER, UNSPECIFIED: ICD-10-CM

## 2024-01-04 DIAGNOSIS — L738 OTHER SPECIFIED DISEASES OF HAIR AND HAIR FOLLICLES: ICD-10-CM

## 2024-01-04 PROBLEM — L02.821 FURUNCLE OF HEAD [ANY PART, EXCEPT FACE]: Status: ACTIVE | Noted: 2024-01-04

## 2024-01-04 PROBLEM — D22.5 MELANOCYTIC NEVI OF TRUNK: Status: ACTIVE | Noted: 2024-01-04

## 2024-01-04 PROCEDURE — 17110 DESTRUCTION B9 LES UP TO 14: CPT

## 2024-01-04 PROCEDURE — 99203 OFFICE O/P NEW LOW 30 MIN: CPT | Mod: 25

## 2024-01-04 PROCEDURE — ? FULL BODY SKIN EXAM

## 2024-01-04 PROCEDURE — ? TREATMENT REGIMEN

## 2024-01-04 PROCEDURE — ? COUNSELING

## 2024-01-04 PROCEDURE — ? PRESCRIPTION

## 2024-01-04 PROCEDURE — ? LIQUID NITROGEN

## 2024-01-04 RX ORDER — CLINDAMYCIN PHOSPHATE 10 MG/ML
SOLUTION TOPICAL QD
Qty: 60 | Refills: 4 | Status: ERX | COMMUNITY
Start: 2024-01-04

## 2024-01-04 RX ORDER — COLESTIPOL HYDROCHLORIDE 1 G/1
2 TABLETS TABLET ORAL TWICE DAILY
Qty: 360 TABLET | Refills: 3

## 2024-01-04 RX ORDER — TRETIONIN 0.5 MG/G
CREAM TOPICAL QOHS
Qty: 45 | Refills: 4 | Status: CANCELLED | COMMUNITY
Start: 2024-01-04

## 2024-01-04 RX ORDER — TRETIONIN 1 MG/G
CREAM TOPICAL QHS
Qty: 45 | Refills: 5 | Status: ERX | COMMUNITY
Start: 2024-01-04

## 2024-01-04 RX ADMIN — TRETIONIN: 1 CREAM TOPICAL at 00:00

## 2024-01-04 RX ADMIN — TRETIONIN: 0.5 CREAM TOPICAL at 00:00

## 2024-01-04 RX ADMIN — CLINDAMYCIN PHOSPHATE: 10 SOLUTION TOPICAL at 00:00

## 2024-01-04 ASSESSMENT — LOCATION SIMPLE DESCRIPTION DERM
LOCATION SIMPLE: LEFT BREAST
LOCATION SIMPLE: LEFT ANTERIOR NECK
LOCATION SIMPLE: RIGHT FOREHEAD
LOCATION SIMPLE: ANTERIOR SCALP
LOCATION SIMPLE: UPPER BACK
LOCATION SIMPLE: RIGHT UPPER BACK
LOCATION SIMPLE: RIGHT ANTERIOR NECK

## 2024-01-04 ASSESSMENT — LOCATION ZONE DERM
LOCATION ZONE: TRUNK
LOCATION ZONE: NECK
LOCATION ZONE: SCALP
LOCATION ZONE: FACE

## 2024-01-04 ASSESSMENT — LOCATION DETAILED DESCRIPTION DERM
LOCATION DETAILED: RIGHT INFERIOR MEDIAL FOREHEAD
LOCATION DETAILED: MID-FRONTAL SCALP
LOCATION DETAILED: LEFT INFERIOR LATERAL NECK
LOCATION DETAILED: LEFT LATERAL BREAST 1-2:00 REGION
LOCATION DETAILED: RIGHT SUPERIOR MEDIAL UPPER BACK
LOCATION DETAILED: RIGHT SUPERIOR LATERAL NECK
LOCATION DETAILED: INFERIOR THORACIC SPINE
LOCATION DETAILED: RIGHT INFERIOR LATERAL NECK

## 2024-01-04 NOTE — PROCEDURE: COUNSELING
Detail Level: Generalized
Detail Level: Zone
Patient Specific Counseling (Will Not Stick From Patient To Patient): Cosmetic cautery for $200 was discussed
Detail Level: Simple

## 2024-01-04 NOTE — PROCEDURE: LIQUID NITROGEN
Show Aperture Variable?: Yes
Render Note In Bullet Format When Appropriate: No
Detail Level: Detailed
Post-Care Instructions: Aftercare Cryosurgery\\nYou have just had cryotherapy for the treatment of benign (non-cancerous) skin lesions. You can expect the pain to rapidly decrease over the next 45 minutes. The area treated will initially turn red and over the next 72 hours turn brown to black. A scab will form that will peel off by itself within 5 to 7 days. A blister or reddish-purple blood blister may form where the area has been treated.
Spray Paint Text: The liquid nitrogen was applied to the skin utilizing a spray paint frosting technique.
Duration Of Freeze Thaw-Cycle (Seconds): 5-10
Medical Necessity Clause: This procedure was medically necessary because the lesions that were treated were:
Consent: The patient's consent was obtained including but not limited to risks of crusting, scabbing, blistering, scarring, darker or lighter pigmentary change, recurrence, incomplete removal and infection.
Medical Necessity Information: It is in your best interest to select a reason for this procedure from the list below. All of these items fulfill various CMS LCD requirements except the new and changing color options.

## 2024-08-26 ENCOUNTER — TELEPHONE ENCOUNTER (OUTPATIENT)
Dept: URBAN - METROPOLITAN AREA CLINIC 7 | Facility: CLINIC | Age: 69
End: 2024-08-26

## 2024-09-05 ENCOUNTER — OFFICE VISIT (OUTPATIENT)
Dept: URBAN - METROPOLITAN AREA CLINIC 7 | Facility: CLINIC | Age: 69
End: 2024-09-05
Payer: COMMERCIAL

## 2024-09-05 ENCOUNTER — LAB OUTSIDE AN ENCOUNTER (OUTPATIENT)
Dept: URBAN - METROPOLITAN AREA CLINIC 7 | Facility: CLINIC | Age: 69
End: 2024-09-05

## 2024-09-05 ENCOUNTER — TELEPHONE ENCOUNTER (OUTPATIENT)
Dept: URBAN - METROPOLITAN AREA CLINIC 7 | Facility: CLINIC | Age: 69
End: 2024-09-05

## 2024-09-05 VITALS
BODY MASS INDEX: 23.32 KG/M2 | TEMPERATURE: 97.6 F | RESPIRATION RATE: 16 BRPM | HEIGHT: 65 IN | DIASTOLIC BLOOD PRESSURE: 70 MMHG | WEIGHT: 140 LBS | SYSTOLIC BLOOD PRESSURE: 120 MMHG

## 2024-09-05 DIAGNOSIS — R10.84 GENERALIZED ABDOMINAL PAIN: ICD-10-CM

## 2024-09-05 DIAGNOSIS — R63.4 WEIGHT LOSS: ICD-10-CM

## 2024-09-05 DIAGNOSIS — R10.13 EPIGASTRIC PAIN: ICD-10-CM

## 2024-09-05 DIAGNOSIS — R19.5 LOOSE STOOL: ICD-10-CM

## 2024-09-05 DIAGNOSIS — K86.1 CHRONIC PANCREATITIS, UNSPECIFIED PANCREATITIS TYPE: ICD-10-CM

## 2024-09-05 DIAGNOSIS — K86.89 PANCREATIC INSUFFICIENCY: ICD-10-CM

## 2024-09-05 DIAGNOSIS — Z86.010 HISTORY OF COLON POLYPS: ICD-10-CM

## 2024-09-05 DIAGNOSIS — Z98.890 HISTORY OF PANCREATIC SURGERY: ICD-10-CM

## 2024-09-05 PROCEDURE — 99214 OFFICE O/P EST MOD 30 MIN: CPT | Performed by: INTERNAL MEDICINE

## 2024-09-05 RX ORDER — VALACYCLOVIR 500 MG/1
TAKE ONE TABLET BY MOUTH ONE TIME DAILY TABLET ORAL
Qty: 90 UNSPECIFIED | Refills: 0 | Status: ACTIVE | COMMUNITY

## 2024-09-05 RX ORDER — POLYETHYLENE GLYCOL 3350 17 G/17G
1 SCOOP MIXED WITH 8 OUNCES OF FLUID POWDER, FOR SOLUTION ORAL ONCE A DAY
OUTPATIENT
Start: 2024-09-05 | End: 2024-10-05

## 2024-09-05 RX ORDER — MULTIVIT-MIN/IRON/FOLIC ACID/K 18-600-40
1 (ONE) CAPSULE ORAL
Qty: 0 | Refills: 16 | Status: ACTIVE | COMMUNITY
Start: 2017-04-03

## 2024-09-05 RX ORDER — QUETIAPINE 100 MG/1
1 TABLET AT BEDTIME TABLET, FILM COATED ORAL ONCE A DAY
Status: ACTIVE | COMMUNITY

## 2024-09-05 RX ORDER — COLESTIPOL HYDROCHLORIDE 1 G/1
2 TABLETS TABLET, FILM COATED ORAL TWICE DAILY
Qty: 120 | Refills: 3 | Status: ACTIVE | COMMUNITY
Start: 2023-12-15

## 2024-09-05 NOTE — HPI-TODAY'S VISIT:
LV 12/2023. Eval was initially for perianal soreness, soiling, and prolapse with internal hemorrhoids. Also, hx of chronic calcific pancreatitis from likely EtOH and tobacco use, s/p Eddy procedure in 2017 with Dr. Mcdonald. Normal fecal elastase then and EUS at that time with no discrete pancreatic mass. Hx of polyps (colon in 2016 with 2 TA). Colon done 7/2020 with 4 adenomas removed, and 1 large 3-cm SSA that could not be removed endoscopically with right hemicolectomy in 8/2020 with Dr. Tracy, hernia repair, and lysis of adhesions. She underwent hemorrhoidal banding after her surgery, and then had recurrence of ventral hernia (repaired by Dr. Broderick). Continued with issues with diarrhea post-operatively. Calprotectin 23, elastase 73, cdiff negative, culture/giardia negative. Colon 10/2021 with multiple small HP and TA polyps, bx negative for inflammation, and small int hems. Treated with Creon. Lab testing done December 20, 2021 demonstrated a hemoglobin of 13.3 and a normal INR and normal creatinine and liver function tests. Had a robotic-assisted laparoscopic repair of a recurrent incisional hernia with mesh in December 2021. CT pancreas 1/2022 with chronic calcific pancreatitis changes, no mass, mild CBD dilation to 12 mm. CBC, CMP, amylase, lipase, CA 19-9 all normal. Treated with Creon, low fat diet. 137 lbs last visit. LV, eval for weight loss. She tells me she has lost a little weight, about low 130s. Using Creon 2 tabs with meals, 1 with snack. Appetite is good, drive to eat is strong, eating well, no nausea, no vomiting, no bleeding. Was having some night sweats as well. Did have some night sweats, no fevers. Her lack of weight gain was her main concern. Plan was for CT pancreas to re-evaluate pancreas given her weight loss, but did feel that her pancreatic insufficiency was leading to some of her weight issues and malabsorption. Bile salt diarrhea is also possible and so started colestipol. Wanted CBC, CMP, amylase, lipase, CA 19-9, CEA as well. Advised to speak with gyne about night sweats as well in case of a hormonal cause. CT pancreas August 2022 demonstrated a small benign liver cyst, absent gallbladder, compensatory dilation of the CBD measuring 1.2 cm, no masses or stones, diffuse atrophic changes the pancreatic body and tail with coarse dystrophic calcifications seen throughout the pancreatic body and tail and mild dilation of the mid and distal pancreatic duct with no evidence of associated masses, no lymphadenopathy, no bowel inflammation.  Labs in August 2022 demonstrated normal CEA and CA 19-9 as well as a normal comprehensive panel normal pancreatic enzymes and hemoglobin of 14. Told me she had some "pancreas" pain a few weeks ago, but generally doing okay. Weight was up 4 lbs since last visit. She has started some bioidentical hormones for low estrogen and low testosterone but then had abd pain and stopped and felt better. Uses estrogel on her arms. Uses Creon still, 2-3 with each meals. She is eating pretty well. At last visit, it was unclear whether or not her hormonal supplements had led to more pain recently, although at the time of her visit she was better.  She continued with pancreatic enzymes and colestipol, she has diarrhea without those agents.  I did expect her to have some pain symptoms from time to time depending on her diet but did not think she needed imaging again.  Plan was to get a CT pancreas in August 2023. Her weight is stable, 138, was 137 last time. She has a little right flank discomfort, but eating well, enzymes are working well for her. Doing metamucil which has helped her stool consistency. LV she was having some right flank discomfort, which seems MSK in nature, but will get CT pancreas for surveillance for chronic pancreatitis. Will continue on Creon 36K units for EPI, 2-3 with meals, 1 snacks. CT pancreas 12/2023 with stable panc atrophy, panc calcifications, stable PD dilation, changes of prior [place] procedure, normal right hemicolectomy appearance, no biliary dilation (prominent extra-hepatic ducts s/p jaylene). Called more recently with ongoing abd pain symptoms. FU now.  She has been having mid-abdominal pain symptoms, more frequent, more symptomatic. Weight is up 2 lbs since last visit. She works out, has muscle mass. Able to eat, but appetite isnt great. Pain feels better with meals, not worse. She is on enzymes. She does have looser stools at times (3-4x in the AM - almost every day).

## 2024-09-12 ENCOUNTER — LAB OUTSIDE AN ENCOUNTER (OUTPATIENT)
Dept: URBAN - METROPOLITAN AREA CLINIC 7 | Facility: CLINIC | Age: 69
End: 2024-09-12

## 2024-09-25 ENCOUNTER — LAB OUTSIDE AN ENCOUNTER (OUTPATIENT)
Dept: URBAN - METROPOLITAN AREA CLINIC 7 | Facility: CLINIC | Age: 69
End: 2024-09-25

## 2025-01-28 ENCOUNTER — ERX REFILL RESPONSE (OUTPATIENT)
Dept: URBAN - METROPOLITAN AREA CLINIC 7 | Facility: CLINIC | Age: 70
End: 2025-01-28

## 2025-01-28 RX ORDER — COLESTIPOL HYDROCHLORIDE 1 G/1
TAKE 2 TABLETS BY MOUTH TWICE  DAILY TABLET ORAL
Qty: 400 TABLET | Refills: 2 | OUTPATIENT

## 2025-01-28 RX ORDER — COLESTIPOL HYDROCHLORIDE 1 G/1
TAKE 2 TABLETS BY MOUTH TWICE  DAILY TABLET ORAL
Qty: 400 TABLET | Refills: 3 | OUTPATIENT

## 2025-01-29 ENCOUNTER — APPOINTMENT (OUTPATIENT)
Dept: URBAN - METROPOLITAN AREA CLINIC 333 | Facility: CLINIC | Age: 70
Setting detail: DERMATOLOGY
End: 2025-01-29

## 2025-01-29 DIAGNOSIS — L72.8 OTHER FOLLICULAR CYSTS OF THE SKIN AND SUBCUTANEOUS TISSUE: ICD-10-CM

## 2025-01-29 DIAGNOSIS — I78.8 OTHER DISEASES OF CAPILLARIES: ICD-10-CM

## 2025-01-29 DIAGNOSIS — L98.8 OTHER SPECIFIED DISORDERS OF THE SKIN AND SUBCUTANEOUS TISSUE: ICD-10-CM

## 2025-01-29 DIAGNOSIS — D22 MELANOCYTIC NEVI: ICD-10-CM

## 2025-01-29 DIAGNOSIS — L24 IRRITANT CONTACT DERMATITIS: ICD-10-CM

## 2025-01-29 PROBLEM — L24.9 IRRITANT CONTACT DERMATITIS, UNSPECIFIED CAUSE: Status: ACTIVE | Noted: 2025-01-29

## 2025-01-29 PROBLEM — D48.5 NEOPLASM OF UNCERTAIN BEHAVIOR OF SKIN: Status: ACTIVE | Noted: 2025-01-29

## 2025-01-29 PROCEDURE — ? SHAVE REMOVAL

## 2025-01-29 PROCEDURE — 11301 SHAVE SKIN LESION 0.6-1.0 CM: CPT

## 2025-01-29 PROCEDURE — ? ADDITIONAL NOTES

## 2025-01-29 PROCEDURE — ? PRESCRIPTION

## 2025-01-29 PROCEDURE — 10060 I&D ABSCESS SIMPLE/SINGLE: CPT

## 2025-01-29 PROCEDURE — ? COUNSELING

## 2025-01-29 PROCEDURE — 99213 OFFICE O/P EST LOW 20 MIN: CPT | Mod: 25

## 2025-01-29 PROCEDURE — ? INCISION AND DRAINAGE

## 2025-01-29 RX ORDER — TRETIONIN 1 MG/G
CREAM TOPICAL QHS
Qty: 45 | Refills: 5 | Status: ERX

## 2025-01-29 ASSESSMENT — LOCATION ZONE DERM
LOCATION ZONE: NOSE
LOCATION ZONE: LIP
LOCATION ZONE: FACE
LOCATION ZONE: TRUNK
LOCATION ZONE: LEG

## 2025-01-29 ASSESSMENT — LOCATION SIMPLE DESCRIPTION DERM
LOCATION SIMPLE: INFERIOR FOREHEAD
LOCATION SIMPLE: CHEST
LOCATION SIMPLE: LEFT THIGH
LOCATION SIMPLE: RIGHT NOSE
LOCATION SIMPLE: ABDOMEN
LOCATION SIMPLE: LEFT NOSE
LOCATION SIMPLE: RIGHT LIP
LOCATION SIMPLE: NOSE

## 2025-01-29 ASSESSMENT — LOCATION DETAILED DESCRIPTION DERM
LOCATION DETAILED: LEFT LATERAL ABDOMEN
LOCATION DETAILED: INFERIOR MID FOREHEAD
LOCATION DETAILED: RIGHT NASAL ALA
LOCATION DETAILED: RIGHT INFERIOR VERMILION LIP
LOCATION DETAILED: LEFT POSTERIOR LATERAL PROXIMAL THIGH
LOCATION DETAILED: NASAL SUPRATIP
LOCATION DETAILED: UPPER STERNUM
LOCATION DETAILED: LEFT NASAL ALA

## 2025-01-29 NOTE — HPI: SKIN LESION
Is This A New Presentation, Or A Follow-Up?: Growth
Additional History: Check possible skin tag on left lower abdomen, left lateral buttock\\n\\nCheck possible rash on chest\\n\\nPatient would like refill of tretinoin

## 2025-01-29 NOTE — PROCEDURE: ADDITIONAL NOTES
Detail Level: Simple
Render Risk Assessment In Note?: no
Additional Notes: LV discussed for $250 per treatment
Additional Notes: Instructed to use less of the tretinoin. \\nPatient declined topical steroid

## 2025-01-29 NOTE — PROCEDURE: INCISION AND DRAINAGE
Detail Level: Detailed
Lesion Type: Cyst
Method: 11 blade
Curette: No
Anesthesia Type: 2% Xylocaine with 1:100,000 epinephrine
Anesthesia Volume In Cc: 0
Drainage Amount?: moderate
Drainage Type?: cyst-like
Dressing: pressure dressing
Epidermal Sutures: 4-0 Ethilon
Epidermal Closure: simple interrupted
Suture Text: The incision was partially closed with
Preparation Text: The area was prepped in the usual clean fashion.
Medical Necessity Clause: The procedure was medically necessary due to one or more of the following: infection, severe pain, erythema, and warmth. These symptoms are either too severe to respond to conservative measures or have failed conservative measures, and, therefore, procedural intervention is medically indicated
Consent was obtained and risks were reviewed including but not limited to delayed wound healing, infection, need for multiple I and D's, and pain.
Render Postcare In Note?: Yes
Post-Care Instructions: I reviewed with the patient in detail post-care instructions. Patient should keep wound covered with pressure bandage x 1 week and call the office should any redness, pain, swelling or worsening occur.

## 2025-02-10 ENCOUNTER — OFFICE VISIT (OUTPATIENT)
Dept: URBAN - METROPOLITAN AREA CLINIC 7 | Facility: CLINIC | Age: 70
End: 2025-02-10
Payer: COMMERCIAL

## 2025-02-10 VITALS
RESPIRATION RATE: 16 BRPM | SYSTOLIC BLOOD PRESSURE: 110 MMHG | DIASTOLIC BLOOD PRESSURE: 70 MMHG | TEMPERATURE: 97.6 F | BODY MASS INDEX: 23.32 KG/M2 | HEIGHT: 65 IN | HEART RATE: 71 BPM | WEIGHT: 140 LBS

## 2025-02-10 DIAGNOSIS — Z86.0100 PERSONAL HISTORY OF COLON POLYPS, UNSPECIFIED: ICD-10-CM

## 2025-02-10 DIAGNOSIS — R63.4 WEIGHT LOSS: ICD-10-CM

## 2025-02-10 DIAGNOSIS — K86.1 CHRONIC PANCREATITIS, UNSPECIFIED PANCREATITIS TYPE: ICD-10-CM

## 2025-02-10 DIAGNOSIS — K86.89 PANCREATIC INSUFFICIENCY: ICD-10-CM

## 2025-02-10 DIAGNOSIS — R10.84 GENERALIZED ABDOMINAL PAIN: ICD-10-CM

## 2025-02-10 DIAGNOSIS — R19.5 LOOSE STOOL: ICD-10-CM

## 2025-02-10 DIAGNOSIS — R10.13 EPIGASTRIC PAIN: ICD-10-CM

## 2025-02-10 DIAGNOSIS — Z98.890 HISTORY OF PANCREATIC SURGERY: ICD-10-CM

## 2025-02-10 PROCEDURE — 99214 OFFICE O/P EST MOD 30 MIN: CPT | Performed by: INTERNAL MEDICINE

## 2025-02-10 RX ORDER — COLESTIPOL HYDROCHLORIDE 1 G/1
TAKE 2 TABLETS BY MOUTH TWICE  DAILY TABLET ORAL
Qty: 400 TABLET | Refills: 2 | Status: ACTIVE | COMMUNITY

## 2025-02-10 RX ORDER — MULTIVIT-MIN/IRON/FOLIC ACID/K 18-600-40
1 (ONE) CAPSULE ORAL
Qty: 0 | Refills: 16 | Status: ACTIVE | COMMUNITY
Start: 2017-04-03

## 2025-02-10 RX ORDER — PANCRELIPASE 36000; 180000; 114000 [USP'U]/1; [USP'U]/1; [USP'U]/1
AS DIRECTED CAPSULE, DELAYED RELEASE PELLETS ORAL
Qty: 900 | Refills: 3

## 2025-02-10 RX ORDER — VALACYCLOVIR 500 MG/1
TAKE ONE TABLET BY MOUTH ONE TIME DAILY TABLET ORAL
Qty: 90 UNSPECIFIED | Refills: 0 | Status: ACTIVE | COMMUNITY

## 2025-02-10 RX ORDER — QUETIAPINE 100 MG/1
1 TABLET AT BEDTIME TABLET, FILM COATED ORAL ONCE A DAY
Status: ACTIVE | COMMUNITY

## 2025-02-10 NOTE — HPI-TODAY'S VISIT:
LV 9/2024. Eval was initially for perianal soreness, soiling, and prolapse with internal hemorrhoids. Also, hx of chronic calcific pancreatitis from likely EtOH and tobacco use, s/p Eddy procedure in 2017 with Dr. Mcdonald. Normal fecal elastase then and EUS at that time with no discrete pancreatic mass. Hx of polyps (colon in 2016 with 2 TA). Colon done 7/2020 with 4 adenomas removed, and 1 large 3-cm SSA that could not be removed endoscopically with right hemicolectomy in 8/2020 with Dr. Tracy, hernia repair, and lysis of adhesions. She underwent hemorrhoidal banding after her surgery, and then had recurrence of ventral hernia (repaired by Dr. Broderick). Continued with issues with diarrhea post-operatively. Calprotectin 23, elastase 73, cdiff negative, culture/giardia negative. Colon 10/2021 with multiple small HP and TA polyps, bx negative for inflammation, and small int hems. Treated with Creon. Lab testing done December 20, 2021 demonstrated a hemoglobin of 13.3 and a normal INR and normal creatinine and liver function tests. Had a robotic-assisted laparoscopic repair of a recurrent incisional hernia with mesh in December 2021. CT pancreas 1/2022 with chronic calcific pancreatitis changes, no mass, mild CBD dilation to 12 mm. CBC, CMP, amylase, lipase, CA 19-9 all normal. Treated with Creon, low fat diet. 137 lbs last visit. LV, eval for weight loss. She tells me she has lost a little weight, about low 130s. Using Creon 2 tabs with meals, 1 with snack. Appetite is good, drive to eat is strong, eating well, no nausea, no vomiting, no bleeding. Was having some night sweats as well. Did have some night sweats, no fevers. Her lack of weight gain was her main concern. Plan was for CT pancreas to re-evaluate pancreas given her weight loss, but did feel that her pancreatic insufficiency was leading to some of her weight issues and malabsorption. Bile salt diarrhea is also possible and so started colestipol. Wanted CBC, CMP, amylase, lipase, CA 19-9, CEA as well. Advised to speak with gyne about night sweats as well in case of a hormonal cause. CT pancreas August 2022 demonstrated a small benign liver cyst, absent gallbladder, compensatory dilation of the CBD measuring 1.2 cm, no masses or stones, diffuse atrophic changes the pancreatic body and tail with coarse dystrophic calcifications seen throughout the pancreatic body and tail and mild dilation of the mid and distal pancreatic duct with no evidence of associated masses, no lymphadenopathy, no bowel inflammation.  Labs in August 2022 demonstrated normal CEA and CA 19-9 as well as a normal comprehensive panel normal pancreatic enzymes and hemoglobin of 14. Told me she had some "pancreas" pain a few weeks ago, but generally doing okay. Weight was up 4 lbs since last visit. She has started some bioidentical hormones for low estrogen and low testosterone but then had abd pain and stopped and felt better. Uses estrogel on her arms. Uses Creon still, 2-3 with each meals. She is eating pretty well. At last visit, it was unclear whether or not her hormonal supplements had led to more pain recently, although at the time of her visit she was better.  She continued with pancreatic enzymes and colestipol, she has diarrhea without those agents.  I did expect her to have some pain symptoms from time to time depending on her diet but did not think she needed imaging again.  Plan was to get a CT pancreas in August 2023. Her weight is stable, 138, was 137 last time. She has a little right flank discomfort, but eating well, enzymes are working well for her. Doing metamucil which has helped her stool consistency. LV she was having some right flank discomfort, which seems MSK in nature, but will get CT pancreas for surveillance for chronic pancreatitis. Will continue on Creon 36K units for EPI, 2-3 with meals, 1 snacks. CT pancreas 12/2023 with stable panc atrophy, panc calcifications, stable PD dilation, changes of prior [place] procedure, normal right hemicolectomy appearance, no biliary dilation (prominent extra-hepatic ducts s/p jaylene). Called more recently with ongoing abd pain symptoms. She had been having mid-abdominal pain symptoms, more frequent, more symptomatic. Weight is up 2 lbs since last visit. She works out, has muscle mass. Able to eat, but appetite isnt great. Pain feels better with meals, not worse. She is on enzymes. She does have looser stools at times (3-4x in the AM - almost every day). Unclear what was causing her intermittent abd pain, but likely related to chronic pancreatitis and a more visceral neuropathic source. WIll do stool testing given looser stools, then do colonoscopy for surveillance, and EGD for epigastric pain. CT pancreas as well. May ultimately EUS with celiac block or use of neuropathic agent.  CT pancreas in September 2024 demonstrated prominent extrahepatic biliary duct similar to prior and likely due to reservoir effect in the setting of prior cholecystectomy, no evidence of biliary obstruction, diffuse parenchymal pancreatic atrophy and calcifications and evidence of prior longitudinal pancreaticojejunostomy with no main pancreatic duct dilation, no lymphadenopathy, no bowel obstruction, no bowel inflammation.  No evidence of any pancreatic malignancy or new inflammatory changes.  I did want her to do some stool testing which has not yet been done and so endoscopies have not yet been pursued. She is feeling better from a bowel perspective right now, using panc enzymes (2 with meals, 1 with snacks). More solid, 2-3x per morning.

## 2025-02-11 ENCOUNTER — TELEPHONE ENCOUNTER (OUTPATIENT)
Dept: URBAN - METROPOLITAN AREA CLINIC 7 | Facility: CLINIC | Age: 70
End: 2025-02-11

## 2025-02-12 ENCOUNTER — TELEPHONE ENCOUNTER (OUTPATIENT)
Dept: URBAN - METROPOLITAN AREA CLINIC 7 | Facility: CLINIC | Age: 70
End: 2025-02-12

## 2025-02-12 RX ORDER — POLYETHYLENE GLYCOL 3350 17 G/DOSE
1 CAPFUL POWDER (GRAM) ORAL
Qty: 238 GRAMS | Refills: 0 | OUTPATIENT
Start: 2025-02-12 | End: 2025-02-13

## 2025-02-20 ENCOUNTER — TELEPHONE ENCOUNTER (OUTPATIENT)
Dept: URBAN - METROPOLITAN AREA CLINIC 7 | Facility: CLINIC | Age: 70
End: 2025-02-20

## 2025-02-27 ENCOUNTER — TELEPHONE ENCOUNTER (OUTPATIENT)
Dept: URBAN - METROPOLITAN AREA CLINIC 7 | Facility: CLINIC | Age: 70
End: 2025-02-27

## 2025-03-13 ENCOUNTER — CLAIMS CREATED FROM THE CLAIM WINDOW (OUTPATIENT)
Dept: URBAN - METROPOLITAN AREA CLINIC 4 | Facility: CLINIC | Age: 70
End: 2025-03-13
Payer: COMMERCIAL

## 2025-03-13 ENCOUNTER — OFFICE VISIT (OUTPATIENT)
Dept: URBAN - METROPOLITAN AREA SURGERY CENTER 5 | Facility: SURGERY CENTER | Age: 70
End: 2025-03-13
Payer: COMMERCIAL

## 2025-03-13 DIAGNOSIS — K63.5 COLON POLYP: ICD-10-CM

## 2025-03-13 DIAGNOSIS — Z98.0 INTESTINAL BYPASS AND ANASTOMOSIS STATUS: ICD-10-CM

## 2025-03-13 DIAGNOSIS — K62.1 RECTAL POLYP: ICD-10-CM

## 2025-03-13 DIAGNOSIS — K64.8 INTERNAL HEMORRHOIDS: ICD-10-CM

## 2025-03-13 DIAGNOSIS — K63.5 HYPERPLASTIC COLON POLYP: ICD-10-CM

## 2025-03-13 DIAGNOSIS — K57.30 DIVERTICULOSIS OF LARGE INTESTINE WITHOUT PERFORATION OR ABSCESS WITHOUT BLEEDING: ICD-10-CM

## 2025-03-13 DIAGNOSIS — Z12.11 ENCOUNTER FOR COLONOSCOPY DUE TO HISTORY OF COLONIC POLYP: ICD-10-CM

## 2025-03-13 DIAGNOSIS — Z86.0100 PERSONAL HISTORY OF COLON POLYPS, UNSPECIFIED: ICD-10-CM

## 2025-03-13 DIAGNOSIS — K63.5 POLYP OF COLON: ICD-10-CM

## 2025-03-13 DIAGNOSIS — Z86.0100 PERSONAL HISTORY OF COLONIC POLYPS: ICD-10-CM

## 2025-03-13 DIAGNOSIS — K57.30 DIVERTICULOSIS OF COLON: ICD-10-CM

## 2025-03-13 DIAGNOSIS — K64.8 HEMORRHOIDS, INTERNAL. NON BLEEDING,: ICD-10-CM

## 2025-03-13 PROCEDURE — 45385 COLONOSCOPY W/LESION REMOVAL: CPT | Performed by: INTERNAL MEDICINE

## 2025-03-13 PROCEDURE — 0529F INTRVL 3/>YR PTS CLNSCP DOCD: CPT | Performed by: INTERNAL MEDICINE

## 2025-03-13 PROCEDURE — 00811 ANES LWR INTST NDSC NOS: CPT | Performed by: NURSE ANESTHETIST, CERTIFIED REGISTERED

## 2025-03-13 PROCEDURE — 88305 TISSUE EXAM BY PATHOLOGIST: CPT | Performed by: PATHOLOGY

## 2025-07-02 ENCOUNTER — APPOINTMENT (OUTPATIENT)
Dept: URBAN - METROPOLITAN AREA CLINIC 333 | Facility: CLINIC | Age: 70
Setting detail: DERMATOLOGY
End: 2025-07-02

## 2025-07-02 DIAGNOSIS — L82.0 INFLAMED SEBORRHEIC KERATOSIS: ICD-10-CM | Status: INADEQUATELY CONTROLLED

## 2025-07-02 DIAGNOSIS — L82.1 OTHER SEBORRHEIC KERATOSIS: ICD-10-CM | Status: STABLE

## 2025-07-02 DIAGNOSIS — L60.3 NAIL DYSTROPHY: ICD-10-CM | Status: INADEQUATELY CONTROLLED

## 2025-07-02 PROCEDURE — ? LIQUID NITROGEN

## 2025-07-02 PROCEDURE — ? PRESCRIPTION MEDICATION MANAGEMENT

## 2025-07-02 PROCEDURE — ? COUNSELING

## 2025-07-02 PROCEDURE — ? PRESCRIPTION

## 2025-07-02 RX ORDER — EFINACONAZOLE 100 MG/ML
1 SOLUTION TOPICAL QHS
Qty: 8 | Refills: 3 | Status: ERX | COMMUNITY
Start: 2025-07-02

## 2025-07-02 RX ADMIN — EFINACONAZOLE 1: 100 SOLUTION TOPICAL at 00:00

## 2025-07-02 ASSESSMENT — LOCATION SIMPLE DESCRIPTION DERM
LOCATION SIMPLE: RIGHT LOWER BACK
LOCATION SIMPLE: ABDOMEN
LOCATION SIMPLE: RIGHT GREAT TOE
LOCATION SIMPLE: RIGHT UPPER BACK
LOCATION SIMPLE: LEFT UPPER BACK

## 2025-07-02 ASSESSMENT — LOCATION DETAILED DESCRIPTION DERM
LOCATION DETAILED: RIGHT INFERIOR LATERAL UPPER BACK
LOCATION DETAILED: LEFT INFERIOR LATERAL UPPER BACK
LOCATION DETAILED: RIGHT SUPERIOR MEDIAL MIDBACK
LOCATION DETAILED: RIGHT GREAT TOENAIL
LOCATION DETAILED: PERIUMBILICAL SKIN
LOCATION DETAILED: LEFT RIB CAGE
LOCATION DETAILED: RIGHT RIB CAGE

## 2025-07-02 ASSESSMENT — LOCATION ZONE DERM
LOCATION ZONE: TOENAIL
LOCATION ZONE: TRUNK

## 2025-07-02 NOTE — PROCEDURE: LIQUID NITROGEN
Show Topical Anesthesia Variable?: Yes
Render Post-Care Instructions In Note?: no
Medical Necessity Clause: This procedure was medically necessary because the lesions that were treated were:
Detail Level: Detailed
Spray Paint Text: The liquid nitrogen was applied to the skin utilizing a spray paint frosting technique.
Post-Care Instructions: Aftercare Cryosurgery\\nYou have just had cryotherapy for the treatment of benign (non-cancerous) skin lesions. You can expect the pain to rapidly decrease over the next 45 minutes. The area treated will initially turn red and over the next 72 hours turn brown to black. A scab will form that will peel off by itself within 5 to 7 days. A blister or reddish-purple blood blister may form where the area has been treated.
Duration Of Freeze Thaw-Cycle (Seconds): 5-10
Medical Necessity Information: It is in your best interest to select a reason for this procedure from the list below. All of these items fulfill various CMS LCD requirements except the new and changing color options.
Consent: The patient's consent was obtained including but not limited to risks of crusting, scabbing, blistering, scarring, darker or lighter pigmentary change, recurrence, incomplete removal and infection.

## 2025-07-02 NOTE — HPI: INFECTION (ONYCHOMYCOSIS)
How Severe Is It?: mild
Is This A New Presentation, Or A Follow-Up?: Nail Infection
Additional History: “Skin tags” on the trunk

## 2025-07-02 NOTE — PROCEDURE: PRESCRIPTION MEDICATION MANAGEMENT
Detail Level: Zone
Plan ciclopirox solution if Jublia is not covered.
Initiate Treatment: Jublia. Apply once a night.
Render In Strict Bullet Format?: No